# Patient Record
(demographics unavailable — no encounter records)

---

## 2024-10-11 NOTE — REASON FOR VISIT
[FreeTextEntry1] : Patient is a 40 y/o woman w/ PMH of HTN, PCOS, recent weight gain presenting for uncontrolled HTN and resuming fertility treatment. Patient was sent to ER on 9/27 following a high BP reading of 181/26 during vaccine clinic with [facility]. In the ED patient had a BP of 183/125. Patient is currently taking Labetalol 300 mg TID. Patient denies headache, blurred vision, N/V, chest pain at the time. Patient also reports high BP reading during UC visit for COVID exposure 3 months ago, and has since been taking BP medication consistently throughout last month w/ occasional missed doses and report inadequate BP control.  Patient reports BP in the past year, when she has checked on occasion, was 140s-150s/90s-100s. Patient denies headaches, blurred vision, and dizziness. Patient denies chest pain, palpitations, syncope, near-syncope, or loss of vision. 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   Patient is currently only taking Labetalol and a multivitamin. Patient is trying with fertility treatments.   interval note 10/7/24: has khang having a hard time controlling her BP at home misses her third dose of Labetalol as per the pt asymptomatic - denies chest pain, SOB/ALMEIDA, palpitations, lightheadedness or syncope will change to labetalol 400 mg qAM and qPM and procardia 30 mg in the middle of the day Encouraged the patient to monitor blood pressure at home, keep a log, and report results back to us for evaluation. Based on results, we will adjust the regimen as necessary. ECG with no acute ischemic changes (stable from prior) will refer for fasting blood work and try to get LDL < 70

## 2024-10-11 NOTE — REASON FOR VISIT
[FreeTextEntry1] : Patient is a 40 y/o woman w/ PMH of HTN, PCOS, recent weight gain presenting for uncontrolled HTN and resuming fertility treatment. Patient was sent to ER on 9/27 following a high BP reading of 181/26 during vaccine clinic with [facility]. In the ED patient had a BP of 183/125. Patient is currently taking Labetalol 300 mg TID. Patient denies headache, blurred vision, N/V, chest pain at the time. Patient also reports high BP reading during UC visit for COVID exposure 3 months ago, and has since been taking BP medication consistently throughout last month w/ occasional missed doses and report inadequate BP control.  Patient reports BP in the past year, when she has checked on occasion, was 140s-150s/90s-100s. Patient denies headaches, blurred vision, and dizziness. Patient denies chest pain, palpitations, syncope, near-syncope, or loss of vision. 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   Patient is currently only taking Labetalol and a multivitamin. Patient is trying with fertility treatments.   interval note 10/7/24: has khang having a hard time controlling her BP at home misses her third dose of Labetalol as per the pt asymptomatic - denies chest pain, SOB/ALMEIDA, palpitations, lightheadedness or syncope will change to labetalol 400 mg qAM and qPM and procardia 30 mg in the middle of the day Encouraged the patient to monitor blood pressure at home, keep a log, and report results back to us for evaluation. Based on results, we will adjust the regimen as necessary. ECG with no acute ischemic changes (stable from prior) will refer for fasting blood work and try to get LDL < 70

## 2024-10-11 NOTE — DISCUSSION/SUMMARY
[EKG obtained to assist in diagnosis and management of assessed problem(s)] : EKG obtained to assist in diagnosis and management of assessed problem(s) [FreeTextEntry1] : Patient is a 40 y/o woman w/ PMH of HTN, PCOS, recent weight gain presenting for uncontrolled HTN and resuming fertility treatment. Patient was sent to ER on 9/27 following a high BP reading of 181/26 during vaccine clinic with [facility]. In the ED patient had a BP of 183/125. Patient is currently taking Labetalol 300 mg TID. Patient denies headache, blurred vision, N/V, chest pain at the time. Patient also reports high BP reading during UC visit for COVID exposure 3 months ago, and has since been taking BP medication consistently throughout last month w/ occasional missed doses and report inadequate BP control.  Patient reports BP in the past year, when she has checked on occasion, was 140s-150s/90s-100s. Patient denies headaches, blurred vision, and dizziness. Patient denies chest pain, palpitations, syncope, near-syncope, or loss of vision. 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   Patient is currently only taking Labetalol and a multivitamin. Patient is trying with fertility treatments.   interval note 10/7/24: has khang having a hard time controlling her BP at home misses her third dose of Labetalol as per the pt asymptomatic - denies chest pain, SOB/ALMEIDA, palpitations, lightheadedness or syncope will change to labetalol 400 mg qAM and qPM and procardia 30 mg in the middle of the day Encouraged the patient to monitor blood pressure at home, keep a log, and report results back to us for evaluation. Based on results, we will adjust the regimen as necessary. ECG with no acute ischemic changes (stable from prior) will refer for fasting blood work and try to get LDL < 70  - BP elevated - Encouraged the patient to monitor blood pressure at home, keep a log, and report results back to us for evaluation. Based on results, we will adjust the regimen as necessary. - ECG with no acute ischemic changes (stable from prior) - work up was reviewed wih the pt - will need routine blood work  addendum 10/11/24 at 3:30 pm got a notification about the reslts of the Xray concerned for PNA - sent a Rx for Zpack and d/w daughter in law the resuts

## 2024-10-11 NOTE — DISCUSSION/SUMMARY
[EKG obtained to assist in diagnosis and management of assessed problem(s)] : EKG obtained to assist in diagnosis and management of assessed problem(s) [FreeTextEntry1] : Patient is a 40 y/o woman w/ PMH of HTN, PCOS, recent weight gain presenting for uncontrolled HTN and resuming fertility treatment. Patient was sent to ER on 9/27 following a high BP reading of 181/26 during vaccine clinic with [facility]. In the ED patient had a BP of 183/125. Patient is currently taking Labetalol 300 mg TID. Patient denies headache, blurred vision, N/V, chest pain at the time. Patient also reports high BP reading during UC visit for COVID exposure 3 months ago, and has since been taking BP medication consistently throughout last month w/ occasional missed doses and report inadequate BP control.  Patient reports BP in the past year, when she has checked on occasion, was 140s-150s/90s-100s. Patient denies headaches, blurred vision, and dizziness. Patient denies chest pain, palpitations, syncope, near-syncope, or loss of vision. 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   Patient is currently only taking Labetalol and a multivitamin. Patient is trying with fertility treatments.   interval note 10/7/24: has khang having a hard time controlling her BP at home misses her third dose of Labetalol as per the pt asymptomatic - denies chest pain, SOB/ALMEIDA, palpitations, lightheadedness or syncope will change to labetalol 400 mg qAM and qPM and procardia 30 mg in the middle of the day Encouraged the patient to monitor blood pressure at home, keep a log, and report results back to us for evaluation. Based on results, we will adjust the regimen as necessary. ECG with no acute ischemic changes (stable from prior) will refer for fasting blood work and try to get LDL < 70  - BP elevated - Encouraged the patient to monitor blood pressure at home, keep a log, and report results back to us for evaluation. Based on results, we will adjust the regimen as necessary. - ECG with no acute ischemic changes (stable from prior) - work up was reviewed wih the pt - will need routine blood work  addendum 10/11/24 at 3:30 pm got a notification about the reslts of the Xray concerned for PNA - sent a Rx for Zpack and d/w daughter in law the resuts

## 2024-10-16 NOTE — HISTORY OF PRESENT ILLNESS
[Home] : at home, [unfilled] , at the time of the visit. [Other Location: e.g. Home (Enter Location, City,State)___] : at [unfilled] [Verbal consent obtained from patient] : the patient, [unfilled] [FreeTextEntry1] : Patient presents for weight loss and overweight/obese comorbidity management  has been struggling with schedule and not able to make time for home cooking recently several ongoing grants at work would like to get back on track with diet and also restart a [facility] med

## 2024-10-16 NOTE — ASSESSMENT
[FreeTextEntry1] : Bariatric surgery history: Overweight / obesity comorbidities: Current anti-obesity medications: Obesity medication side effects:  start zpb 2.5 mg -> 5 mg reviewed nutrition at length resourrces shared education cooking

## 2024-10-21 NOTE — PLAN
[Supportive Therapy] : Supportive Therapy [Recommended Frequency of Visits: ____] : Recommended frequency of visits: [unfilled] [Return in ____ week(s)] : Return in [unfilled] week(s) [FreeTextEntry2] : Pt. has identified the following psychotherapy goals thus far:  Problem 1: Negative core beliefs, worry, and rumination Goal 1: Increase use of cognitive restructuring skills by 30% Goal 2: Reduce engagement in procrastination/avoidance behaviors by 30% Goal 3: Reduce GERALD-7 score by 30% Goal 4: Increase engagement in physical exercise by 30% Problem 3: Difficulties with communication Goal 1: Learn 1-2 skills for assertive communication   [de-identified] : Pt. reported that she has been managing time effectively, though she recognizes that she needs to pull back from a project in order to continue to effectively manage time. Writer reinforced pt.'s decision to remove herself from a project and validated pt.'s emotional response to this decision. Additionally, writer reinforced pt.'s plan to use assertive language when communicating her desires and expectations for a current relationship. Writer and pt. also discussed an intermediate belief pt. holds. Writer and pt. will review tx plan at f/u.

## 2024-10-21 NOTE — RISK ASSESSMENT
[Low acute suicide risk] : Low acute suicide risk [No] : No [Not clinically indicated] : Safety Plan completed/updated (for individuals at risk): Not clinically indicated [FreeTextEntry8] : Pt. did not endorse SI during this appt. and has not endorsed SI in the past month. [FreeTextEntry7] : Pt. did not endorse HI during this appt. and has not endorsed HI in the past month.

## 2024-10-21 NOTE — PLAN
[Supportive Therapy] : Supportive Therapy [Recommended Frequency of Visits: ____] : Recommended frequency of visits: [unfilled] [Return in ____ week(s)] : Return in [unfilled] week(s) [FreeTextEntry2] : Pt. has identified the following psychotherapy goals thus far:  Problem 1: Negative core beliefs, worry, and rumination Goal 1: Increase use of cognitive restructuring skills by 30% Goal 2: Reduce engagement in procrastination/avoidance behaviors by 30% Goal 3: Reduce GERALD-7 score by 30% Goal 4: Increase engagement in physical exercise by 30% Problem 3: Difficulties with communication Goal 1: Learn 1-2 skills for assertive communication   [de-identified] : Pt. reported that she has been managing time effectively, though she recognizes that she needs to pull back from a project in order to continue to effectively manage time. Writer reinforced pt.'s decision to remove herself from a project and validated pt.'s emotional response to this decision. Additionally, writer reinforced pt.'s plan to use assertive language when communicating her desires and expectations for a current relationship. Writer and pt. also discussed an intermediate belief pt. holds. Writer and pt. will review tx plan at f/u.

## 2024-10-21 NOTE — REASON FOR VISIT
[Patient preference] : as per patient preference [Continuing, patient not seen in-person within last 12 months (provide details below)] : Telehealth services are continuing, patient not seen in-person within last 12 months.  [Telehealth (audio & video) - Individual/Group] : This visit was provided via telehealth using real-time 2-way audio visual technology. [Other Location: e.g. Home (Enter Location, City,State)___] : The patient, [unfilled], was located at [unfilled] at the time of the visit. [Patient's space is appropriate for telehealth and maintains privacy/confidentiality.] : Patient's space is appropriate for telehealth and maintains privacy/confidentiality. [Verbal consent obtained from patient/other participant(s)] : Verbal consent for telehealth/telephonic services obtained from patient/other participant(s) [Patient] : Patient [Supervisor present for visit (for trainees)] : Supervisor present for visit (for trainees). [FreeTextEntry4] : 5:10PM [FreeTextEntry5] : 5:55PM [FreeTextEntry1] : anxiety; grief

## 2024-10-25 NOTE — ASSESSMENT
[FreeTextEntry1] : ALYSSIA RICKS is a 41 year-old F with a long-standing h/o obesity, who presents today for initial evaluation for weight management options.   Had a lengthy discussion with the patient regarding nutrition, exercise, weight loss medications, and bariatric surgery. The patient qualifies for bariatric surgery but is not interested at this time. Reviewed surgical options such as the gastric Saurabh-en-Y bypass and Sleeve Gastrectomy, and the risks and benefits of each. Discussed how bariatric surgery may offer greater weight loss results with better long-term success. The patient qualifies for and is currently most interested in weight loss medications. The following risks of bariatric surgery were discussed with the patient with diagrams. All questions answered.   Complications were discussed including but not limited to: vitamin and protein deficiencies, pneumonia, urinary infection, wound infection, leaks/peritonitis possibly requiring intraabdominal drains or reoperation, bleeding, DVT, pulmonary embolus, severe reflux, sleeve obstruction, anastomotic stricture, anastomotic ulcer, abdominal wall hernias, gallstone formation, revisions, death, inadequate weight loss. The importance of vitamins and protein supplementation was stressed, as was the importance of follow-up and exercise.   Health maintenance and behavioral/nutrition counseling for obesity: An additional 30 minutes of the visit was spent counseling the patient regarding need for aggressive weight loss and behavior modification including nutrition and proper eating habits, including which foods to eat and avoid.   Emphasized the importance of making healthier food choices including fresh fruits and vegetables, lean meats, and protein sources. Recommended front-loading calories, incorporating whole grains, and eliminating fast foods. Also discussed the importance of avoiding fried/fatty foods and foods containing high sugar content including juices/shakes/sodas/desserts.   Also encouraged beginning an exercise program and recommended cardiovascular exercises along with strength-training to build lean muscle. Made suggestions on different types of exercises to try.   Patient will work on the following:   Aim for at least 64 oz water/day Limit liquid calories Avoid snacking, especially after dinner snacking (consider drinking water instead; can add fruit like Watermelon, Cucumber, Lemon to water)  Focus on eating 3 well-balanced meals with lean protein and fiber during the daytime with appropriate portion size Try to make vegetables the largest portion of each meal, followed by a lean protein (e.g. lentils, chicken, fish), and finally a complex carbohydrate if still hungry (e.g. sweet potato, farro, quinoa)  Limit red/fatty meat Avoid cheese Cooking fresh meals rather than take out/processed/ready-made foods Incorporating exercise (aim for 150 mins/week with both cardio- and strength-training) Continue following-up with OM for weight loss medication management   Patient educated to call with questions/concerns All questions answered Follow-up as needed  Additional time spent before and after visit reviewing chart.

## 2024-10-25 NOTE — PHYSICAL EXAM
[Obese, well nourished, in no acute distress] : obese, well nourished, in no acute distress [Normal] : affect appropriate [de-identified] : deferred due to telehealth

## 2024-10-25 NOTE — REASON FOR VISIT
[Initial Consult] : an initial consult for [Other___] : [unfilled] [Home] : at home, [unfilled] , at the time of the visit. [Medical Office: ([facility])___] : at the medical office located in  [Patient] : the patient [Self] : self

## 2024-10-25 NOTE — HISTORY OF PRESENT ILLNESS
[de-identified] : ALYSSIA RICKS is a 41-year-old F with a long-standing Hx of obesity, previously on Wegovy (by OM), who presents today for initial evaluation for weight management options. The patient meets the criteria for surgery and the pt's health insurance company requires surgical consultation to make sure that the patient is aware of all weight loss options. Pt hopes to lose weight to improve health and to become pregnant. Discussed with pt that she will have to be off weight loss medications for at least 2 months prior to trying to get conceive.    Heaviest/current wt: 254 lbs/252 lbs, lowest wt: 170 lbs. Goal weight: 200 lbs Diets/exercise programs tried in the past: yes Weight loss medications tried in the past: Wegovy (for 3 months) Reason for stopping weight loss medication if applicable: lack of supply   PMH and FH of: pancreatitis: no gallstones: no kidney stones: no MEN syndrome: no Medullary thyroid cancer: no Anxiety: yes Eating disorder: no Glaucoma: no Ophthalmological Contraindications: no Currently taking narcotic pain medication(s) or suboxone: no   History of bariatric surgery: no Obesity comorbidities: JULES, HTN, HLD Comorbidities improved/resolved: n/a Anti-obesity medication: none   Current dietary lifestyle: Breakfast: Coffee (with cream and sugar) and 2 eggs and toast; cheerios; oatmeal Lunch: sandwich; tuna salad wrap with cheese Dinner: meat (i.e. chicken, salmon, beef, shrimp), rice, vegetables; e.g. Andrade's pie Snacks: Throughout the day (e.g. 3-5 PM) and after dinner: ice cream, fruit, trail mix, Greek yogurt Drinks: water (~64 oz/day)   Activity Lifestyle: Sleep: 5-6 hrs/night Physical activity/exercise: daily activity Work: researcher  Smoking/EtOH: nonsmoker; occasional EtOH use   Females of Childbearing Age: Plans for future pregnancy: yes If yes, when: within the next few years Form(s) of Contraception: n/a Currently breastfeeding: no   Last Mammogram: 10/2023 Last Pap Smear: 10/2023

## 2024-11-04 NOTE — PHYSICAL EXAM
[Average] : average [Cooperative] : cooperative [Full] : full [Clear] : clear [Linear/Goal Directed] : linear/goal directed [WNL] : within normal limits [FreeTextEntry8] : Dysphoric Undermining Type: Entire Wound

## 2024-11-04 NOTE — REASON FOR VISIT
[Patient preference] : as per patient preference [Continuing, patient not seen in-person within last 12 months (provide details below)] : Telehealth services are continuing, patient not seen in-person within last 12 months.  [Telehealth (audio & video) - Individual/Group] : This visit was provided via telehealth using real-time 2-way audio visual technology. [Other Location: e.g. Home (Enter Location, City,State)___] : The provider was located at [unfilled]. [Home] : The patient, [unfilled], was located at home, [unfilled], at the time of the visit. [Patient's space is appropriate for telehealth and maintains privacy/confidentiality.] : Patient's space is appropriate for telehealth and maintains privacy/confidentiality. [Verbal consent obtained from patient/other participant(s)] : Verbal consent for telehealth/telephonic services obtained from patient/other participant(s) [FreeTextEntry4] : 12:00PM [FreeTextEntry5] : 12:53PM [Patient] : Patient [Supervisor present for visit (for trainees)] : Supervisor present for visit (for trainees). [FreeTextEntry1] : anxiety

## 2024-11-04 NOTE — RISK ASSESSMENT
[FreeTextEntry8] : Pt. did not endorse SI during this appt. and has not endorsed SI in the past month. [FreeTextEntry7] : Pt. did not endorse HI during this appt. and has not endorsed HI in the past month. [Low acute suicide risk] : Low acute suicide risk [No] : No [Not clinically indicated] : Safety Plan completed/updated (for individuals at risk): Not clinically indicated

## 2024-11-04 NOTE — PLAN
[FreeTextEntry2] : Pt. has identified the following psychotherapy goals thus far:  Problem 1: Negative core beliefs, worry, and rumination Goal 1: Increase use of cognitive restructuring skills by 30% Goal 2: Reduce engagement in procrastination/avoidance behaviors by 30% Goal 3: Reduce GERALD-7 score by 30% Goal 4: Increase engagement in physical exercise by 30% Problem 3: Difficulties with communication Goal 1: Learn 1-2 skills for assertive communication   [Supportive Therapy] : Supportive Therapy [de-identified] : Pt. reported that she has felt overwhelmed by the many medical appointments that she has needed to attend recently for family planning. Writer validated pt.'s emotional response to this stressor. Writer and pt. began to review pt.'s progress in her treatment goals. Pt. indicated that she feels better able to identify and challenge unhelpful beliefs and implement assertive communication, though she struggles to engage in physical exercise consistently and has concerns about her recent time management. To that end, writer and pt. agreed that pt. would benefit from the following homework: (1) Identifying 1-3 barriers to exercise and 1-3 motivating factors, (2) identifying 1-2 work-related tasks to pull back from or delegate. Pt. also noted that she is interested in pursuing medication management of her anxiety again, as she stopped medication after Dr. Davenport retired. Writer provided pt. with prospective prescribers can contact. [Recommended Frequency of Visits: ____] : Recommended frequency of visits: [unfilled] [Return in ____ week(s)] : Return in [unfilled] week(s)

## 2024-11-25 NOTE — REASON FOR VISIT
[FreeTextEntry1] : Patient is a 40 y/o woman w/ PMH of HTN, PCOS, recent weight gain presenting for uncontrolled HTN and resuming fertility treatment. Patient was sent to ER on 9/27 following a high BP reading of 181/26 during vaccine clinic with [facility]. In the ED patient had a BP of 183/125. Patient is currently taking Labetalol 300 mg TID. Patient denies headache, blurred vision, N/V, chest pain at the time. Patient also reports high BP reading during UC visit for COVID exposure 3 months ago, and has since been taking BP medication consistently throughout last month w/ occasional missed doses and report inadequate BP control.  Patient reports BP in the past year, when she has checked on occasion, was 140s-150s/90s-100s. Patient denies headaches, blurred vision, and dizziness. Patient denies chest pain, palpitations, syncope, near-syncope, or loss of vision. 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   Patient is currently only taking Labetalol and a multivitamin. Patient is trying with fertility treatments.   interval note 10/7/24: has khang having a hard time controlling her BP at home misses her third dose of Labetalol as per the pt asymptomatic - denies chest pain, SOB/ALMEIDA, palpitations, lightheadedness or syncope will change to labetalol 400 mg qAM and qPM and procardia 30 mg in the middle of the day Encouraged the patient to monitor blood pressure at home, keep a log, and report results back to us for evaluation. Based on results, we will adjust the regimen as necessary. ECG with no acute ischemic changes (stable from prior) will refer for fasting blood work and try to get LDL < 70 [Symptom and Test Evaluation] : symptom and test evaluation

## 2024-11-25 NOTE — HISTORY OF PRESENT ILLNESS
[FreeTextEntry1] : Ms. Kraus is a 42 y/o woman w/ PMH of HTN, PCOS, recent weight gain presented to [facility]  for  management of uncontrolled HTN and resuming fertility treatment.  Most recently BP range elevated 160's systolic and modifications of antihypertensive regimen made : Labetalol increased from 400 mg to 600  bid, Procardia increased from 30 to 60 mg daily, BP logs reflect decreased BP over the past 1-2 weeks.    Patient denies headaches, blurred vision, and dizziness. Patient denies chest pain, palpitations, syncope, near-syncope, or loss of vision. Patient reports increased exercise routine, is followed in Obesity clinic, recently started Zepbound and has lost 10 lb. Has increased hydration as well despite challenges of work schedule. Recent traveling  to [facility] for conference.  Patient mentions will need clearance for IVF possibly January 2025.  BP today: 125/85 EKG: NSR, no ischemic changes LABwork:  most recent lab results:  Lipid profile: and Lipoprotein A: 209.8:   ( up from 115 12/23; chol 183, unchanged, HDL 46, unchanged: recently started on Pravastatin  20 mg hs Diagnostic testing previously included Stress testing, Echo and CT angio heart  CT Angio heart, score 1, TTE: normal LV function, no valvular heart disease Stress test with 1 mm ST changes: ST heart done for this reasong as noted above asymptomatic with exercise and at rest.

## 2024-11-25 NOTE — DISCUSSION/SUMMARY
[EKG obtained to assist in diagnosis and management of assessed problem(s)] : EKG obtained to assist in diagnosis and management of assessed problem(s) [FreeTextEntry1] : In summary, Ms. Kraus is a 40 y/o woman w/ PMH of HTN, PCOS, recent weight gain presented [facility]  for  management of uncontrolled HTN and resuming fertility treatment.  Most recently BP range elevated 160's systolic and modifications of antihypertensive regimen made : Labetalol increased from 400 mg to 600  bid, Procardia increased from 30 to 60 mg daily, BP logs reflect decreased BP over the past 1-2 weeks.    Patient denies headaches, blurred vision, and dizziness. Patient denies chest pain, palpitations, syncope, near-syncope, or loss of vision. Patient reports increased exercise routine, is followed in Obesity clinic, recently started Zepbound and has lost 10 lb. Has increased hydration as well despite challenges of work schedule. Recent traveling  to [place] for conference.  Patient mentions will need clearance for IVF possibly January 2025.  BP today: 125/85 EKG: NSR, no ischemic changes LABwork:  most recent lab results:  Lipid profile: and Lipoprotein A: 209.8:   ( up from 115 12/23; chol 183, unchanged, HDL 46, unchanged: recently started on Pravastatin  20 mg hs Diagnostic testing previously included Stress testing, Echo and CT angio heart  CT Angio heart, score 1, TTE: normal LV function, no valvular heart disease Stress test with 1 mm ST changes: ST heart done for this reasong as noted above asymptomatic with exercise and at rest.     # Hypertension with previous poor control - BP control improved -Continue current antihypertensive regimen -Education re: meds and adherence, timing of meds -Continues to reduce salt intake, Mediterranean style diet - Encouraged the patient to monitor blood pressure at home, keep a log, and report results back to us for evaluation. Based on results, we will adjust the regimen as necessary.  # Familial hyperlipidema -Pravastatin started ( anticipating IVF) Encouraged patient to continue healthy exercise and eating habits, focusing on a Mediterranean style of eating and aiming for the recommended 150 minutes per week of moderate physical activity.

## 2024-11-25 NOTE — REASON FOR VISIT
[FreeTextEntry1] : Patient is a 40 y/o woman w/ PMH of HTN, PCOS, recent weight gain presenting for uncontrolled HTN and resuming fertility treatment. Patient was sent to ER on 9/27 following a high BP reading of 181/26 during vaccine clinic with [facility]. In the ED patient had a BP of 183/125. Patient is currently taking Labetalol 300 mg TID. Patient denies headache, blurred vision, N/V, chest pain at the time. Patient also reports high BP reading during UC visit for COVID exposure 3 months ago, and has since been taking BP medication consistently throughout last month w/ occasional missed doses and report inadequate BP control.  Patient reports BP in the past year, when she has checked on occasion, was 140s-150s/90s-100s. Patient denies headaches, blurred vision, and dizziness. Patient denies chest pain, palpitations, syncope, near-syncope, or loss of vision. 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   Patient is currently only taking Labetalol and a multivitamin. Patient is trying with fertility treatments.   interval note 10/7/24: has khang having a hard time controlling her BP at home misses her third dose of Labetalol as per the pt asymptomatic - denies chest pain, SOB/ALMEIDA, palpitations, lightheadedness or syncope will change to labetalol 400 mg qAM and qPM and procardia 30 mg in the middle of the day Encouraged the patient to monitor blood pressure at home, keep a log, and report results back to us for evaluation. Based on results, we will adjust the regimen as necessary. ECG with no acute ischemic changes (stable from prior) will refer for fasting blood work and try to get LDL < 70 [Symptom and Test Evaluation] : symptom and test evaluation

## 2024-11-25 NOTE — DISCUSSION/SUMMARY
[EKG obtained to assist in diagnosis and management of assessed problem(s)] : EKG obtained to assist in diagnosis and management of assessed problem(s) [FreeTextEntry1] : In summary, Ms. Kraus is a 42 y/o woman w/ PMH of HTN, PCOS, recent weight gain presented [facility]  for  management of uncontrolled HTN and resuming fertility treatment.  Most recently BP range elevated 160's systolic and modifications of antihypertensive regimen made : Labetalol increased from 400 mg to 600  bid, Procardia increased from 30 to 60 mg daily, BP logs reflect decreased BP over the past 1-2 weeks.    Patient denies headaches, blurred vision, and dizziness. Patient denies chest pain, palpitations, syncope, near-syncope, or loss of vision. Patient reports increased exercise routine, is followed in Obesity clinic, recently started Zepbound and has lost 10 lb. Has increased hydration as well despite challenges of work schedule. Recent traveling  to [place] for conference.  Patient mentions will need clearance for IVF possibly January 2025.  BP today: 125/85 EKG: NSR, no ischemic changes LABwork:  most recent lab results:  Lipid profile: and Lipoprotein A: 209.8:   ( up from 115 12/23; chol 183, unchanged, HDL 46, unchanged: recently started on Pravastatin  20 mg hs Diagnostic testing previously included Stress testing, Echo and CT angio heart  CT Angio heart, score 1, TTE: normal LV function, no valvular heart disease Stress test with 1 mm ST changes: ST heart done for this reasong as noted above asymptomatic with exercise and at rest.     # Hypertension with previous poor control - BP control improved -Continue current antihypertensive regimen -Education re: meds and adherence, timing of meds -Continues to reduce salt intake, Mediterranean style diet - Encouraged the patient to monitor blood pressure at home, keep a log, and report results back to us for evaluation. Based on results, we will adjust the regimen as necessary.  # Familial hyperlipidema -Pravastatin started ( anticipating IVF) Encouraged patient to continue healthy exercise and eating habits, focusing on a Mediterranean style of eating and aiming for the recommended 150 minutes per week of moderate physical activity.

## 2024-12-04 NOTE — PLAN
[Cognitive and/or Behavior Therapy] : Cognitive and/or Behavior Therapy  [Recommended Frequency of Visits: ____] : Recommended frequency of visits: [unfilled] [Return in ____ week(s)] : Return in [unfilled] week(s) [FreeTextEntry2] : Pt. has identified the following psychotherapy goals thus far:  Problem 1: Negative core beliefs, worry, and rumination Goal 1: Increase use of cognitive restructuring skills by 30% Goal 2: Reduce engagement in procrastination/avoidance behaviors by 30% Goal 3: Reduce GERALD-7 score by 30% Goal 4: Increase engagement in physical exercise by 30% Problem 3: Difficulties with communication Goal 1: Learn 1-2 skills for assertive communication   [de-identified] : As noted in pt.'s EMR, pt. experienced a difficult breakup since her last appt. Writer continued to validate pt.'s emotional response to this stressor. Pt. identified core beliefs that were triggered by this experience. Writer and pt. reflected on how pt. can use cognitive restructuring tools to identify and challenge core and automatic negative beliefs as they arise. Pt. was receptive to Socratic dialogue.

## 2024-12-04 NOTE — REASON FOR VISIT
[Patient preference] : as per patient preference [Continuing, patient not seen in-person within last 12 months (provide details below)] : Telehealth services are continuing, patient not seen in-person within last 12 months.  [Telehealth (audio & video) - Individual/Group] : This visit was provided via telehealth using real-time 2-way audio visual technology. [Other Location: e.g. Home (Enter Location, City,State)___] : The provider was located at [unfilled]. [Home] : The patient, [unfilled], was located at home, [unfilled], at the time of the visit. [Patient's space is appropriate for telehealth and maintains privacy/confidentiality.] : Patient's space is appropriate for telehealth and maintains privacy/confidentiality. [Verbal consent obtained from patient/other participant(s)] : Verbal consent for telehealth/telephonic services obtained from patient/other participant(s) [Patient] : Patient [Supervisor present for visit (for trainees)] : Supervisor present for visit (for trainees). [FreeTextEntry4] : 5:07PM [FreeTextEntry5] : 6:00PM [FreeTextEntry1] : anxiety

## 2024-12-19 NOTE — HISTORY OF PRESENT ILLNESS
[FreeTextEntry1] : 41yo G0 LMPyesterday here for annual exam   eggs frozen just broarash w new BF considering  sperm donation [facility] [place] [Mammogramdate] : 2024 [PapSmeardate] : 2023

## 2024-12-19 NOTE — PHYSICAL EXAM
[Chaperone Declined] : Patient declined chaperone [Appropriately responsive] : appropriately responsive [Alert] : alert [No Acute Distress] : no acute distress [No Lymphadenopathy] : no lymphadenopathy [Soft] : soft [Non-tender] : non-tender [Non-distended] : non-distended [No HSM] : No HSM [No Lesions] : no lesions [No Mass] : no mass [Oriented x3] : oriented x3 [Examination Of The Breasts] : a normal appearance [No Masses] : no breast masses were palpable [Labia Majora] : normal [Labia Minora] : normal [Moderate] : There was moderate vaginal bleeding [Normal] : normal [Enlarged ___ wks] : enlarged [unfilled] ~Uweeks [Uterine Adnexae] : normal [Tenderness] : nontender [FreeTextEntry6] : fibroids

## 2024-12-20 NOTE — REASON FOR VISIT
[Patient preference] : as per patient preference [Continuing, patient not seen in-person within last 12 months (provide details below)] : Telehealth services are continuing, patient not seen in-person within last 12 months.  [Telehealth (audio & video) - Individual/Group] : This visit was provided via telehealth using real-time 2-way audio visual technology. [Other Location: e.g. Home (Enter Location, City,State)___] : The provider was located at [unfilled]. [Home] : The patient, [unfilled], was located at home, [unfilled], at the time of the visit. [Patient's space is appropriate for telehealth and maintains privacy/confidentiality.] : Patient's space is appropriate for telehealth and maintains privacy/confidentiality. [Verbal consent obtained from patient/other participant(s)] : Verbal consent for telehealth/telephonic services obtained from patient/other participant(s) [FreeTextEntry4] : 4:10PM [FreeTextEntry5] : 4:55PM [Patient] : Patient [Supervisor present for visit (for trainees)] : Supervisor present for visit (for trainees). [FreeTextEntry1] : anxiety

## 2025-01-02 NOTE — REASON FOR VISIT
[Patient preference] : as per patient preference [Continuing, patient not seen in-person within last 12 months (provide details below)] : Telehealth services are continuing, patient not seen in-person within last 12 months.  [Telehealth (audio & video) - Individual/Group] : This visit was provided via telehealth using real-time 2-way audio visual technology. [Other Location: e.g. Home (Enter Location, City,State)___] : The provider was located at [unfilled]. [Home] : The patient, [unfilled], was located at home, [unfilled], at the time of the visit. [Patient's space is appropriate for telehealth and maintains privacy/confidentiality.] : Patient's space is appropriate for telehealth and maintains privacy/confidentiality. [Verbal consent obtained from patient/other participant(s)] : Verbal consent for telehealth/telephonic services obtained from patient/other participant(s) [FreeTextEntry4] : 5:10PM [FreeTextEntry5] : 5:55PM [Patient] : Patient [Supervisor present for visit (for trainees)] : Supervisor present for visit (for trainees). [FreeTextEntry1] : anxiety

## 2025-01-02 NOTE — PLAN
[FreeTextEntry2] : Pt. has identified the following psychotherapy goals thus far:  Problem 1: Negative core beliefs, worry, and rumination Goal 1: Increase use of cognitive restructuring skills by 30% Goal 2: Reduce engagement in procrastination/avoidance behaviors by 30% Goal 3: Reduce GERALD-7 score by 30% Goal 4: Increase engagement in physical exercise by 30% Problem 3: Difficulties with communication Goal 1: Learn 1-2 skills for assertive communication   [Cognitive and/or Behavior Therapy] : Cognitive and/or Behavior Therapy  [de-identified] : Pt. reported that she was experiencing low mood and heightened anxiety. Further assessment of pt.'s current mood sx to be completed at f/u. Pt. explained that she had an intake appointment scheduled to meet with a new psychiatrist, but that the provider needed to cancel and had not yet rescheduled. Writer agreed to connect pt. with [facility] to help pt. link to alternative psychiatry options. Writer and pt. also reviewed transition plan for tahirar's Feb of 2025 maternity leave. Writer and pt. then reviewed how pt. can use thought logs during the week to identify and modify unhelpful beliefs. Pt. requested to meet again next week given current challenges. [Recommended Frequency of Visits: ____] : Recommended frequency of visits: [unfilled] [Return in ____ week(s)] : Return in [unfilled] week(s)

## 2025-01-08 NOTE — HISTORY OF PRESENT ILLNESS
[Home] : at home, [unfilled] , at the time of the visit. [Other Location: e.g. Home (Enter Location, City,State)___] : at [unfilled] [Verbal consent obtained from patient] : the patient, [unfilled] [FreeTextEntry1] : Patient presents for weight loss and overweight/obese comorbidity management  has done well with WL but family planning therapy now starting and will be an indefinite pause in taking zpb heartburn now improving after ppi

## 2025-01-08 NOTE — ASSESSMENT
[FreeTextEntry1] : Bariatric surgery history: n Overweight / obesity comorbidities: jacque htn hld Current anti-obesity medications: zpb Obesity medication side effects: heartburn  reviewed likely outcomes over next few months stop zpb  follow up prn

## 2025-01-12 NOTE — PLAN
[FreeTextEntry2] : Pt. has identified the following psychotherapy goals thus far:  Problem 1: Negative core beliefs, worry, and rumination Goal 1: Increase use of cognitive restructuring skills by 30% Goal 2: Reduce engagement in procrastination/avoidance behaviors by 30% Goal 3: Reduce GERALD-7 score by 30% Goal 4: Increase engagement in physical exercise by 30% Problem 3: Difficulties with communication Goal 1: Learn 1-2 skills for assertive communication   [Cognitive and/or Behavior Therapy] : Cognitive and/or Behavior Therapy  [de-identified] : Pt. was in contact with [facility] to discuss medication management options and noted that she would work with [facility] to secure an intake appointment with a prescriber. Pt. also noted that she received the referral list that writer provided and scheduled an intake with a therapist who she may transition to when writer goes on maternity leave in Feb of 2025. Pt. agreed to sign DERICK so that writer could communicate with that therapist. Remainder of appointment focused on discussion of pt.'s communication with others in her life. Specifically, pt. identified that she has a tendency of avoiding voicing frustrations to friends or family. Writer and pt. weighed the costs and benefits of this habit, as well as potential barriers to breaking this habit. Pt. identified one relationship in her life that she plans to counter this habit with in the next week by having an assertive discussion. [Recommended Frequency of Visits: ____] : Recommended frequency of visits: [unfilled] [Return in ____ week(s)] : Return in [unfilled] week(s)

## 2025-01-12 NOTE — REASON FOR VISIT
[Patient preference] : as per patient preference [Continuing, patient not seen in-person within last 12 months (provide details below)] : Telehealth services are continuing, patient not seen in-person within last 12 months.  [Telehealth (audio & video) - Individual/Group] : This visit was provided via telehealth using real-time 2-way audio visual technology. [Other Location: e.g. Home (Enter Location, City,State)___] : The provider was located at [unfilled]. [Home] : The patient, [unfilled], was located at home, [unfilled], at the time of the visit. [Patient's space is appropriate for telehealth and maintains privacy/confidentiality.] : Patient's space is appropriate for telehealth and maintains privacy/confidentiality. [Verbal consent obtained from patient/other participant(s)] : Verbal consent for telehealth/telephonic services obtained from patient/other participant(s) [FreeTextEntry4] : 12:00PM [FreeTextEntry5] : 12:45PM [Patient] : Patient [Supervisor present for visit (for trainees)] : Supervisor present for visit (for trainees). [FreeTextEntry1] : anxiety

## 2025-01-27 NOTE — HISTORY OF PRESENT ILLNESS
[FreeTextEntry1] : Ms. Kraus is a 43 y/o woman w/ PMH of HTN, PCOS, recent weight gain presented [facility]  for  management of uncontrolled HTN and resuming fertility treatment.  Most recently BP range elevated 160's systolic and modifications of antihypertensive regimen made : Labetalol increased from 400 mg to 600  bid, Procardia increased from 30 to 60 mg daily, BP logs reflect decreased BP over the past 1-2 weeks.    Patient denies headaches, blurred vision, and dizziness. Patient denies chest pain, palpitations, syncope, near-syncope, or loss of vision. Patient reports increased exercise routine, is followed in Obesity clinic, recently started Zepbound and has lost 10 lb. Has increased hydration as well despite challenges of work schedule. Recent traveling  to [facility] for conference.  Patient mentions will need clearance for IVF possibly January 2025.  BP today: 130/85 EKG: NSR, no ischemic changes LABwork:  most recent lab results 10/2024: Lipid profile: and Lipoprotein A: 209.8:   ( up from 115 12/23; chol 183, unchanged, HDL 46, unchanged: recently started on Pravastatin  20 mg hs Diagnostic testing previously included Stress testing, Echo and CT angio heart  CT Angio heart, score 1, TTE: normal LV function, no valvular heart disease Stress test with 1 mm ST changes: ST heart done for this reason as noted above asymptomatic with exercise and at rest.   imterval note 1/27/25  here to obtain clearance for IVF pt had a full work up including a cardiac CTA in 2023 feels well asymptomatic - denies chest pain, SOB/ALMEIDA, palpitations, lightheadedness or syncope BP better controlled at home 118/80s on labetalol 600 mg bid and nifedipine 60 mg daily has been better about meal preps and exercises 20 mg 3x a week with no symptoms

## 2025-01-27 NOTE — DISCUSSION/SUMMARY
[FreeTextEntry1] : Ms. Kraus is a 43 y/o woman w/ PMH of HTN, PCOS, recent weight gain presented to [facility]  for  management of uncontrolled HTN and resuming fertility treatment.  Most recently BP range elevated 160's systolic and modifications of antihypertensive regimen made : Labetalol increased from 400 mg to 600  bid, Procardia increased from 30 to 60 mg daily, BP logs reflect decreased BP over the past 1-2 weeks.    Patient denies headaches, blurred vision, and dizziness. Patient denies chest pain, palpitations, syncope, near-syncope, or loss of vision. Patient reports increased exercise routine, is followed in Obesity clinic, recently started Zepbound and has lost 10 lb. Has increased hydration as well despite challenges of work schedule. Recent traveling  to [facility] for conference.  Patient mentions will need clearance for IVF possibly January 2025.  BP today: 130/85 EKG: NSR, no ischemic changes LABwork:  most recent lab results 10/2024: Lipid profile: and Lipoprotein A: 209.8:   ( up from 115 12/23; chol 183, unchanged, HDL 46, unchanged: recently started on Pravastatin  20 mg hs Diagnostic testing previously included Stress testing, Echo and CT angio heart  CT Angio heart, score 1, TTE: normal LV function, no valvular heart disease Stress test with 1 mm ST changes: ST heart done for this reason as noted above asymptomatic with exercise and at rest.   imterval note 1/27/25  here to obtain clearance for IVF pt had a full work up including a cardiac CTA in 2023 feels well asymptomatic - denies chest pain, SOB/ALMEIDA, palpitations, lightheadedness or syncope BP better controlled at home 118/80s on labetalol 600 mg bid and nifedipine 60 mg daily has been better about meal preps and exercises 20 mg 3x a week with no symptoms  # Hypertension with previous poor control - BP control improved -Continue current antihypertensive regimen -Education re: meds and adherence, timing of meds -Continues to reduce salt intake, Mediterranean style diet - Encouraged the patient to monitor blood pressure at home, keep a log, and report results back to us for evaluation. Based on results, we will adjust the regimen as necessary.  # Familial hyperlipidema -Pravastatin started ( anticipating IVF) - will refer for fasting blood work Encouraged patient to continue healthy exercise and eating habits, focusing on a Mediterranean style of eating and aiming for the recommended 150 minutes per week of moderate physical activity.  At the current time, patient is at acceptable risk for the IVF procedure. Patient is asymptomatic with no evidence of acute decompensated heart failure, unstable angina or arrhythmias. No further cardiac work up needed at this time.  [EKG obtained to assist in diagnosis and management of assessed problem(s)] : EKG obtained to assist in diagnosis and management of assessed problem(s)

## 2025-02-15 NOTE — PLAN
[FreeTextEntry2] : Pt. has identified the following psychotherapy goals thus far:  Problem 1: Negative core beliefs, worry, and rumination Goal 1: Increase use of cognitive restructuring skills by 30% Goal 2: Reduce engagement in procrastination/avoidance behaviors by 30% Goal 3: Reduce GERALD-7 score by 30% Goal 4: Increase engagement in physical exercise by 30% Problem 3: Difficulties with communication Goal 1: Learn 1-2 skills for assertive communication   [Cognitive and/or Behavior Therapy] : Cognitive and/or Behavior Therapy  [Other: ____] : [unfilled] [de-identified] : Progress Note- final session:  Due to writer's maternity leave, writer and pt. terminated therapy on this date. Pt. reported that she attended two sessions with another therapist who she intends to continue to see for longer-term therapy. Pt. completed DERICK and writer noted that she has reached out to pt.'s transfer therapist to discuss treatment summary and recommendations for ongoing care. Writer and pt. reviewed pt.'s progress in therapy and reflected on her goals for ongoing therapy.   TerminationSummary  Initial Complaints and Symptoms:  Pt. 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 Further assessment indicated that pt.'s symptoms met DSM-5 criteria for generalized anxiety disorder. Pt.'s initial therapy goals included: (1) increasing engagement in pleasurable and mastery activities by 30%; (2) learning 1-3 cognitive restructuring tools for modifying core beliefs and better managing worry and rumination.    Number, Type of Sessions and Course of Treatment:  This course of pt.'s treatment from April of 2022 was primarily focused on use of cognitive behavioral interventions. Writer and pt. identified procrastination and avoidance behaviors that were contributing to maintenance of pt.'s work-related anxiety, and throughout therapy worked together to make behavioral plans to counter urges to procrastinate and avoid. Writer and pt. reviewed how to identify unhelpful automatic, intermediate, and core beliefs.  Writer introduced disputing questions that pt. could use to counter those beliefs independently, and engaged pt. in Socratic dialogue in sessions to facilitate restructuring of those beliefs. Additionally, writer introduced such skills as delaying worry to worry periods, and decatastrophizing, which pt. was receptive to. Writer engaged pt. in imaginal exposures to further counter catastrophic worries. Pt. experienced several significant losses during her course of therapy with , and so subsequent sessions focused on support of normative grieving process through validation and psychoeducation. Where appropriate, writer engaged pt. in Motivational Interviewing to evoke and enhance change-talk associated with health-related and interpersonal behaviors. Relatedly, writer introduced DEAR MAN as a skill pt. could use to foster assertive communication of wants and needs to friends, family, and romantic partners.   Pt.'s PHQ-9 score was an 11 and GERALD-7 a 12 on intake. Upon termination on this date, pt.'s PHQ-9 and GERALD-7 scores were 7 and 11, respectively. Pt. attributed heightened anxiety at the time of termination to several significant changes at work and to her beginning fertility treatment. Pt. began medication management of anxiety with a  psychiatrist that she planned to continue upon termination of therapy with writer. Additionally, as noted above pt. secured a therapist to transfer to for ongoing therapy. Pt. also indicated that she joined a support group for individuals pursuing fertility treatment.  Treatment Summary:  Pt. consistently attended scheduled sessions and often completed homework assignments. Pt. expressed appreciation for therapy as a resource for supporting her during a difficult transitional time. Pt. indicated that therapy with writer helped her to become more aware of unhelpful beliefs and better able to challenge those beliefs. Pt. completed DERICK and writer will communicate with pt.'s new therapist.

## 2025-02-15 NOTE — REASON FOR VISIT
[Patient preference] : as per patient preference [Continuing, patient not seen in-person within last 12 months (provide details below)] : Telehealth services are continuing, patient not seen in-person within last 12 months.  [Telehealth (audio & video) - Individual/Group] : This visit was provided via telehealth using real-time 2-way audio visual technology. [Other Location: e.g. Home (Enter Location, City,State)___] : The provider was located at [unfilled]. [Home] : The patient, [unfilled], was located at home, [unfilled], at the time of the visit. [Patient's space is appropriate for telehealth and maintains privacy/confidentiality.] : Patient's space is appropriate for telehealth and maintains privacy/confidentiality. [Verbal consent obtained from patient/other participant(s)] : Verbal consent for telehealth/telephonic services obtained from patient/other participant(s) [FreeTextEntry5] : 4:15PM [FreeTextEntry4] : 3:30PM [Patient] : Patient [Supervisor present for visit (for trainees)] : Supervisor present for visit (for trainees). [FreeTextEntry1] : anxiety

## 2025-02-15 NOTE — RISK ASSESSMENT
[FreeTextEntry8] : Pt. denied SI during this appt. and has not endorsed SI in the past month. [FreeTextEntry7] : Pt. did not endorse HI during this appt. and has not endorsed HI in the past month. [Low acute suicide risk] : Low acute suicide risk [No] : No [Not clinically indicated] : Safety Plan completed/updated (for individuals at risk): Not clinically indicated

## 2025-02-15 NOTE — DISCUSSION/SUMMARY
[FreeTextEntry1] : Pt. provided DERICK and so writer spoke with pt.'s new therapist, who she is transferring to at [facility]. Writer communicated treatment summary and recommendations to Kacy Kirk LCSW, by phone.

## 2025-06-12 NOTE — HISTORY OF PRESENT ILLNESS
[FreeTextEntry1] : Ms. Kraus is a 43 y/o woman w/ PMH of HTN, PCOS, recent weight gain presented to [facility]  for  management of uncontrolled HTN and resuming fertility treatment.  Most recently BP range elevated 160's systolic and modifications of antihypertensive regimen made : Labetalol increased from 400 mg to 600  bid, Procardia increased from 30 to 60 mg daily, BP logs reflect decreased BP over the past 1-2 weeks.    Patient denies headaches, blurred vision, and dizziness. Patient denies chest pain, palpitations, syncope, near-syncope, or loss of vision. Patient reports increased exercise routine, is followed in Obesity clinic, recently started Zepbound and has lost 10 lb. Has increased hydration as well despite challenges of work schedule. Recent traveling  to [facility] for conference.  Patient mentions will need clearance for IVF possibly January 2025.  BP today: 166/117 EKG: NSR, no ischemic changes LABwork:  most recent lab results 10/2024: Lipid profile: and Lipoprotein A: 209.8:   ( up from 115 12/23; chol 183, unchanged, HDL 46, unchanged: recently started on Pravastatin  20 mg hs Diagnostic testing previously included Stress testing, Echo and CT angio heart  CT Angio heart, score 1, TTE: normal LV function, no valvular heart disease Stress test with 1 mm ST changes: ST heart done for this reason as noted above asymptomatic with exercise and at rest.   imterval note 6/12/25  here to obtain clearance for IVF pt had a full work up including a cardiac CTA in 2023 feels well asymptomatic - denies chest pain, SOB/ALMEIDA, palpitations, lightheadedness or syncope BP not controlled in the office 166/117 - has not been checking at home on labetalol 600 mg bid and nifedipine 60 mg daily on pravavstatin has been better about meal preps and exercises 20 mg 3x a week with no symptoms

## 2025-06-12 NOTE — DISCUSSION/SUMMARY
[FreeTextEntry1] : Ms. Kraus is a 41 y/o woman w/ PMH of HTN, PCOS, recent weight gain presented to [facility]  for  management of uncontrolled HTN and resuming fertility treatment.  Most recently BP range elevated 160's systolic and modifications of antihypertensive regimen made : Labetalol increased from 400 mg to 600  bid, Procardia increased from 30 to 60 mg daily, BP logs reflect decreased BP over the past 1-2 weeks.    Patient denies headaches, blurred vision, and dizziness. Patient denies chest pain, palpitations, syncope, near-syncope, or loss of vision. Patient reports increased exercise routine, is followed in Obesity clinic, recently started Zepbound and has lost 10 lb. Has increased hydration as well despite challenges of work schedule. Recent traveling  to [facility] for conference.  Patient mentions will need clearance for IVF possibly January 2025.  BP today: 166/117 EKG: NSR, no ischemic changes LABwork:  most recent lab results 10/2024: Lipid profile: and Lipoprotein A: 209.8:   ( up from 115 12/23; chol 183, unchanged, HDL 46, unchanged: recently started on Pravastatin  20 mg hs Diagnostic testing previously included Stress testing, Echo and CT angio heart  CT Angio heart, score 1, TTE: normal LV function, no valvular heart disease Stress test with 1 mm ST changes: ST heart done for this reason as noted above asymptomatic with exercise and at rest.   imterval note 6/12/25  here to obtain clearance for IVF pt had a full work up including a cardiac CTA in 2023 feels well asymptomatic - denies chest pain, SOB/ALMEIDA, palpitations, lightheadedness or syncope BP not controlled in the office 166/117 - has not been checking at home on labetalol 600 mg bid and nifedipine 60 mg daily on pravavstatin has been better about meal preps and exercises 20 mg 3x a week with no symptoms  # Hypertension with previous poor control - BP not well cotrnolled - will await for a 2 week BP log prior to making any changes to meds -Continue current antihypertensive regimen -Education re: meds and adherence, timing of meds -Continues to reduce salt intake, Mediterranean style diet - Encouraged the patient to monitor blood pressure at home, keep a log, and report results back to us for evaluation. Based on results, we will adjust the regimen as necessary.  # Familial hyperlipidema -Pravastatin started ( anticipating IVF) - will refer for fasting blood work Encouraged patient to continue healthy exercise and eating habits, focusing on a Mediterranean style of eating and aiming for the recommended 150 minutes per week of moderate physical activity.  At the current time, patient is at acceptable risk for the IVF procedure. Patient is asymptomatic with no evidence of acute decompensated heart failure, unstable angina or arrhythmias. No further cardiac work up needed at this time.  [EKG obtained to assist in diagnosis and management of assessed problem(s)] : EKG obtained to assist in diagnosis and management of assessed problem(s)